# Patient Record
Sex: MALE | Race: WHITE
[De-identification: names, ages, dates, MRNs, and addresses within clinical notes are randomized per-mention and may not be internally consistent; named-entity substitution may affect disease eponyms.]

---

## 2022-06-21 ENCOUNTER — FORM ENCOUNTER (OUTPATIENT)
Age: 32
End: 2022-06-21

## 2022-06-22 ENCOUNTER — APPOINTMENT (OUTPATIENT)
Dept: ORTHOPEDIC SURGERY | Facility: CLINIC | Age: 32
End: 2022-06-22
Payer: COMMERCIAL

## 2022-06-22 ENCOUNTER — APPOINTMENT (OUTPATIENT)
Dept: MRI IMAGING | Facility: CLINIC | Age: 32
End: 2022-06-22
Payer: COMMERCIAL

## 2022-06-22 VITALS — BODY MASS INDEX: 25.92 KG/M2 | WEIGHT: 175 LBS | HEIGHT: 69 IN

## 2022-06-22 DIAGNOSIS — S93.602A UNSPECIFIED SPRAIN OF LEFT FOOT, INITIAL ENCOUNTER: ICD-10-CM

## 2022-06-22 DIAGNOSIS — J45.909 UNSPECIFIED ASTHMA, UNCOMPLICATED: ICD-10-CM

## 2022-06-22 PROBLEM — Z00.00 ENCOUNTER FOR PREVENTIVE HEALTH EXAMINATION: Status: ACTIVE | Noted: 2022-06-22

## 2022-06-22 PROCEDURE — 73718 MRI LOWER EXTREMITY W/O DYE: CPT | Mod: LT

## 2022-06-22 PROCEDURE — L4361: CPT | Mod: LT

## 2022-06-22 PROCEDURE — 73630 X-RAY EXAM OF FOOT: CPT | Mod: LT

## 2022-06-22 PROCEDURE — 99204 OFFICE O/P NEW MOD 45 MIN: CPT

## 2022-06-22 PROCEDURE — E0114: CPT

## 2022-06-22 NOTE — IMAGING
[de-identified] : PE L foot: +swelling to dorsum of foot, +tenderness over lisfranc ligament, EHL/FHL/ADF/APF intact, no ankle tenderness, NVI, calves soft, NT. [Left] : left ankle [There are no fractures, subluxations or dislocations. No significant abnormalities are seen] : There are no fractures, subluxations or dislocations. No significant abnormalities are seen

## 2022-06-22 NOTE — ASSESSMENT
[FreeTextEntry1] : A/P L foot sprain, r/p lisfranc injury\par - cam walker\par - NWB\par - ice/elevation\par - MRI\par - f/u foot/ankle

## 2022-06-22 NOTE — HISTORY OF PRESENT ILLNESS
[Sudden] : sudden [10] : 10 [8] : 8 [de-identified] : 30 y/o M kicked someone today with L foot pain. Pt went to work all day with pain and swelling, denies numbness/tingling. [FreeTextEntry5] : pt states he does [employer] and kicked somebody on elbow today \par pt c.o pain in lt foot\par

## 2022-06-27 ENCOUNTER — APPOINTMENT (OUTPATIENT)
Dept: ORTHOPEDIC SURGERY | Facility: CLINIC | Age: 32
End: 2022-06-27
Payer: COMMERCIAL

## 2022-06-27 VITALS — HEIGHT: 69 IN | WEIGHT: 175 LBS | BODY MASS INDEX: 25.92 KG/M2

## 2022-06-27 DIAGNOSIS — S93.622A SPRAIN OF TARSOMETATARSAL LIGAMENT OF LEFT FOOT, INITIAL ENCOUNTER: ICD-10-CM

## 2022-06-27 PROCEDURE — 99203 OFFICE O/P NEW LOW 30 MIN: CPT

## 2022-06-27 NOTE — DATA REVIEWED
[MRI] : MRI [Left] : left [Foot] : foot [I reviewed the films/CD and additionally noted] : I reviewed the films/CD and additionally noted [FreeTextEntry1] : conutsion, mild lisfranc sprain

## 2022-06-27 NOTE — HISTORY OF PRESENT ILLNESS
[Sports related] : sports related [8] : 8 [2] : 2 [Dull/Aching] : dull/aching [de-identified] : 6/27/22 injury kickboxing 5 days ago w/ foot pain. went to walk in and given boot and sent for mri. no prior foot probs. no dm/tob. [employer] [FreeTextEntry1] : lt foot

## 2022-06-27 NOTE — PHYSICAL EXAM
[Left] : left foot and ankle [5___] : [facility] 5[unfilled]/5 [2+] : dorsalis pedis pulse: 2+ [] : ambulation with crutches [FreeTextEntry8] : dorsal foot ttp [de-identified] : plantar flexion 30 degrees [TWNoteComboBox7] : dorsiflexion 10 degrees

## 2022-06-27 NOTE — ASSESSMENT
[FreeTextEntry1] : wbat\par cam boot - may transition to sneaker as radha\par activity as radha\par ice/elevate\par nsaids prn\par f/up 1 month if not resolved

## 2024-12-14 ENCOUNTER — NON-APPOINTMENT (OUTPATIENT)
Age: 34
End: 2024-12-14